# Patient Record
Sex: MALE | Race: WHITE | HISPANIC OR LATINO | URBAN - METROPOLITAN AREA
[De-identification: names, ages, dates, MRNs, and addresses within clinical notes are randomized per-mention and may not be internally consistent; named-entity substitution may affect disease eponyms.]

---
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
## 2024-06-13 ENCOUNTER — EMERGENCY (EMERGENCY)
Facility: HOSPITAL | Age: 19
LOS: 1 days | Discharge: HOME | End: 2024-06-13
Attending: INTERNAL MEDICINE | Admitting: INTERNAL MEDICINE
Payer: MEDICAID

## 2024-06-13 VITALS
DIASTOLIC BLOOD PRESSURE: 52 MMHG | SYSTOLIC BLOOD PRESSURE: 102 MMHG | HEART RATE: 54 BPM | HEIGHT: 67 IN | RESPIRATION RATE: 18 BRPM | OXYGEN SATURATION: 97 % | TEMPERATURE: 98 F | WEIGHT: 154.98 LBS

## 2024-06-13 PROCEDURE — 73630 X-RAY EXAM OF FOOT: CPT

## 2024-06-13 PROCEDURE — 73630 X-RAY EXAM OF FOOT: CPT | Mod: 26,LT

## 2024-06-13 PROCEDURE — 29515 APPLICATION SHORT LEG SPLINT: CPT | Mod: LT

## 2024-06-13 PROCEDURE — 99284 EMERGENCY DEPT VISIT MOD MDM: CPT | Mod: 57

## 2024-06-13 PROCEDURE — 28470 CLTX METATARSAL FX WO MNP EA: CPT | Mod: 54,T4

## 2024-06-13 PROCEDURE — 99283 EMERGENCY DEPT VISIT LOW MDM: CPT

## 2024-06-13 RX ORDER — IBUPROFEN 200 MG
1 TABLET ORAL
Qty: 40 | Refills: 0
Start: 2024-06-13 | End: 2024-06-22

## 2024-06-13 RX ORDER — IBUPROFEN 200 MG
600 TABLET ORAL ONCE
Refills: 0 | Status: COMPLETED | OUTPATIENT
Start: 2024-06-13 | End: 2024-06-13

## 2024-06-13 RX ADMIN — Medication 600 MILLIGRAM(S): at 15:46

## 2024-06-13 NOTE — ED ADULT NURSE NOTE - OBJECTIVE STATEMENT
19 year old Male comes to the ER with left foot pain after playing soccer yesterday. Patient otherwise alert and oriented, moving all extremities. Bed locked and in lowest position for safety.

## 2024-06-13 NOTE — ED ADULT TRIAGE NOTE - IDEAL BODY WEIGHT(KG)
Spoke with Raleigh Adams - she is not able to accept the 11:30 appointment. Scheduled at 3:00 pm on Thursday, 9/5/19. 65

## 2024-06-13 NOTE — ED PROVIDER NOTE - OBJECTIVE STATEMENT
19-year-old male with no significant past medical history came to the emergency room chief complaint of left foot pain injury playing sports twisted the left foot yesterday difficulty walking limping while walking Patient had pain and swelling the lateral side of the left foot

## 2024-06-13 NOTE — ED ADULT TRIAGE NOTE - CHIEF COMPLAINT QUOTE
Pt s/p left foot pain and swelling yesterday while playing soccer. Pt endorses 8/10 pain on the top of his left foot. Pt reports difficulty with ambulation due to the pain. Denies falling or any other injuries.

## 2024-06-13 NOTE — ED PROVIDER NOTE - PHYSICAL EXAMINATION
General:     NAD, well-nourished, well-appearing  Head:     NC/AT, EOMI, oral mucosa moist  Neck:     trachea midline  Lungs:     CTA b/l, no w/r/r  CVS:     S1S2, RRR, no m/g/r  Abd:     +BS, s/nt/nd, no organomegaly  Ext:    2+ radial and pedal pulses, no c/c/e  Neuro: AAOx3, no sensory/motor deficits  Ortho–left foot lateral fifth metatarsal base tenderness positive DP cap refill less than 2 seconds sensory motor intact

## 2024-06-13 NOTE — ED PROVIDER NOTE - NSFOLLOWUPINSTRUCTIONS_ED_ALL_ED_FT
Follow up with your PMD within 1-2 days.  Rest, increase your fluids, advance your activity as tolerated.   Take all of your other medications as previously prescribed.   Worsening, continued or ANY new concerning symptoms return to the emergency department.  Splint to be applied all the time during shower plastic has to be applied so he does not get wet crutches for ambulation follow-up with podiatry Motrin for pain

## 2024-06-13 NOTE — ED PROVIDER NOTE - CARE PROVIDER_API CALL
Deng Loo.  Podiatric Medicine and Surgery  [address], [address]  [place] [address]  Phone: [phone]  Fax: [phone]  Follow Up Time:

## 2024-06-13 NOTE — ED PROVIDER NOTE - PATIENT PORTAL LINK FT
You can access the FollowMyHealth Patient Portal offered by [facility] by registering at the following website: [email or web address]. By joining [facility]’s FollowMyHealth portal, you will also be able to view your health information using other applications (apps) compatible with our system.

## 2025-02-03 NOTE — ED PROVIDER NOTE - MUSCULOSKELETAL
Mood well-controlled.  Orders:    mirtazapine (REMERON) 7.5 MG tablet; Take 1 tablet (7.5 mg total) by mouth daily at bedtime     Spine appears normal, movement of extremities grossly intact.